# Patient Record
Sex: FEMALE | Race: BLACK OR AFRICAN AMERICAN | NOT HISPANIC OR LATINO | Employment: OTHER | URBAN - METROPOLITAN AREA
[De-identification: names, ages, dates, MRNs, and addresses within clinical notes are randomized per-mention and may not be internally consistent; named-entity substitution may affect disease eponyms.]

---

## 2019-07-17 PROBLEM — I10 HTN (HYPERTENSION): Status: ACTIVE | Noted: 2019-07-17

## 2019-07-17 PROBLEM — J44.9 COPD (CHRONIC OBSTRUCTIVE PULMONARY DISEASE): Status: ACTIVE | Noted: 2019-07-17

## 2019-07-17 PROBLEM — C32.1: Status: ACTIVE | Noted: 2019-07-17

## 2019-07-17 PROBLEM — I25.10 CAD (CORONARY ARTERY DISEASE): Status: ACTIVE | Noted: 2019-07-17

## 2022-05-05 PROBLEM — I65.02 STENOSIS OF LEFT VERTEBRAL ARTERY: Status: ACTIVE | Noted: 2022-05-05

## 2022-05-06 PROBLEM — R79.89 TROPONIN LEVEL ELEVATED: Status: ACTIVE | Noted: 2022-05-06

## 2022-05-06 PROBLEM — I16.9 HYPERTENSIVE CRISIS: Status: RESOLVED | Noted: 2019-07-17 | Resolved: 2022-05-06

## 2022-05-06 PROBLEM — I16.9 HYPERTENSIVE CRISIS: Status: ACTIVE | Noted: 2019-07-17

## 2022-05-06 PROBLEM — I21.4 NSTEMI (NON-ST ELEVATED MYOCARDIAL INFARCTION): Status: ACTIVE | Noted: 2022-05-06

## 2022-05-06 PROBLEM — I16.0 HYPERTENSIVE URGENCY: Status: ACTIVE | Noted: 2022-05-06

## 2022-05-06 PROBLEM — R07.9 CHEST PAIN: Status: ACTIVE | Noted: 2022-05-06

## 2022-05-07 PROBLEM — I20.89 ANGINA AT REST: Status: ACTIVE | Noted: 2022-05-06

## 2022-05-10 PROBLEM — I16.0 HYPERTENSIVE URGENCY: Status: RESOLVED | Noted: 2022-05-06 | Resolved: 2022-05-10

## 2022-05-10 PROBLEM — I20.89 ANGINA AT REST: Status: RESOLVED | Noted: 2022-05-06 | Resolved: 2022-05-10

## 2022-05-10 PROBLEM — R79.89 TROPONIN LEVEL ELEVATED: Status: RESOLVED | Noted: 2022-05-06 | Resolved: 2022-05-10

## 2022-05-10 PROBLEM — I21.4 NSTEMI (NON-ST ELEVATED MYOCARDIAL INFARCTION): Status: RESOLVED | Noted: 2022-05-06 | Resolved: 2022-05-10

## 2022-05-12 ENCOUNTER — PATIENT OUTREACH (OUTPATIENT)
Dept: ADMINISTRATIVE | Facility: CLINIC | Age: 77
End: 2022-05-12

## 2022-05-12 NOTE — PROGRESS NOTES
C3 nurse attempted to contact Nora Rebollar  for a TCC post hospital discharge follow up call. No answer. No voicemail available.The patient does not have a scheduled HOSFU appointment. Message sent to PCP staff for assistance with scheduling visit with patient.       Pt A&O x4. VS stable; afebrile. Still requiring 2L O2 w/ activity. PO oxycodone and scheduled tylenol managing pain. CMS: baseline numbness in BLE's. Dressing: scant amount of dried drainage. Incision iced. Up w/ A1, using gait belt, and walker. Voiding in good amts. Tolerating regular diet.     Reviewed discharge instructions and medications with patient. Questions answered. Patient discharged to TCU via [facility] transport w/ discharge instruction packet and belongings at this time.

## 2022-05-13 NOTE — PROGRESS NOTES
3rd Attempt made to reach patient for TCC call. Left voicemail please call [phone] leave first name, last name, and  for Marya I will return your call.

## 2022-05-25 PROBLEM — I25.118 CORONARY ARTERY DISEASE OF NATIVE ARTERY WITH STABLE ANGINA PECTORIS: Status: ACTIVE | Noted: 2022-05-25

## 2022-05-25 PROBLEM — I10 UNCONTROLLED HYPERTENSION: Status: ACTIVE | Noted: 2022-05-25

## 2022-06-08 PROBLEM — E78.00 HYPERCHOLESTEROLEMIA: Status: ACTIVE | Noted: 2022-06-08

## 2022-06-08 PROBLEM — R00.2 PALPITATIONS: Status: ACTIVE | Noted: 2022-06-08

## 2022-08-09 PROBLEM — M79.89 LEG SWELLING: Status: ACTIVE | Noted: 2022-08-09

## 2022-09-12 PROBLEM — I50.31 ACUTE DIASTOLIC HEART FAILURE: Status: ACTIVE | Noted: 2022-09-12

## 2022-09-12 PROBLEM — J44.1 COPD EXACERBATION: Status: ACTIVE | Noted: 2022-09-12

## 2022-09-12 PROBLEM — E83.42 HYPOMAGNESEMIA: Status: ACTIVE | Noted: 2022-09-12

## 2022-09-12 PROBLEM — E43 SEVERE PROTEIN-ENERGY MALNUTRITION: Status: ACTIVE | Noted: 2022-09-12

## 2022-09-13 PROBLEM — I27.20 PULMONARY HYPERTENSION: Status: ACTIVE | Noted: 2022-09-13

## 2022-09-14 PROBLEM — J44.1 COPD EXACERBATION: Status: RESOLVED | Noted: 2022-09-12 | Resolved: 2022-09-14

## 2022-09-14 PROBLEM — I50.31 ACUTE DIASTOLIC HEART FAILURE: Status: RESOLVED | Noted: 2022-09-12 | Resolved: 2022-09-14

## 2022-09-15 ENCOUNTER — PATIENT OUTREACH (OUTPATIENT)
Dept: ADMINISTRATIVE | Facility: CLINIC | Age: 77
End: 2022-09-15

## 2022-09-28 PROBLEM — N18.31 STAGE 3A CHRONIC KIDNEY DISEASE: Status: ACTIVE | Noted: 2022-09-28

## 2022-09-28 PROBLEM — D72.829 LEUCOCYTOSIS: Status: ACTIVE | Noted: 2022-09-28

## 2022-09-28 PROBLEM — R53.1 GENERALIZED WEAKNESS: Status: ACTIVE | Noted: 2022-09-28

## 2022-09-28 PROBLEM — R74.01 TRANSAMINITIS: Status: ACTIVE | Noted: 2022-09-28

## 2022-09-29 PROBLEM — N17.9 AKI (ACUTE KIDNEY INJURY): Status: ACTIVE | Noted: 2022-09-29

## 2023-01-02 PROBLEM — N17.9 AKI (ACUTE KIDNEY INJURY): Status: RESOLVED | Noted: 2022-09-29 | Resolved: 2023-01-02

## 2023-02-09 PROBLEM — I27.20 PULMONARY HYPERTENSION: Status: RESOLVED | Noted: 2022-09-13 | Resolved: 2023-02-09

## 2023-03-25 PROBLEM — E87.20 LACTIC ACID ACIDOSIS: Status: ACTIVE | Noted: 2023-03-25

## 2023-03-25 PROBLEM — R63.6 SEVERELY UNDERWEIGHT ADULT: Status: ACTIVE | Noted: 2023-03-25

## 2023-03-25 PROBLEM — E86.9 VOLUME DEPLETION: Status: ACTIVE | Noted: 2023-03-25

## 2023-03-26 PROBLEM — R20.9 COLD RIGHT FOOT: Status: ACTIVE | Noted: 2023-03-26

## 2023-03-30 ENCOUNTER — PATIENT OUTREACH (OUTPATIENT)
Dept: ADMINISTRATIVE | Facility: CLINIC | Age: 78
End: 2023-03-30

## 2023-03-30 NOTE — PROGRESS NOTES
C3 nurse attempted to contact patient. The following occurred:   C3 nurse attempted to contact Nora Rebollar  for a TCC post hospital discharge follow up call. The patient is unable to conduct the call @ this time. The patient requested a callback.    The patient does not have a scheduled HOSFU appointment within 5-7 days post hospital discharge date 03/29/2023. Non [facility] PCP

## 2023-03-31 NOTE — PROGRESS NOTES
OTC cough meds    C3 nurse spoke with Nora Rebollar ( son)  for a TCC post hospital discharge follow up call. The patient does not have a scheduled HOSFU appointment with Emery Mendez MD  within 5-7 days post hospital discharge date 03/29/2023. C3 nurse was unable to schedule HOSFU appointment in [facility].    Please contact pcp  and schedule follow up appointment using HOSFU visit type on or before 04/05/2023.

## 2023-04-28 PROBLEM — E83.51 HYPOCALCEMIA: Status: ACTIVE | Noted: 2023-04-28

## 2023-04-28 PROBLEM — E16.2 HYPOGLYCEMIA: Status: ACTIVE | Noted: 2023-04-28

## 2023-04-28 PROBLEM — E87.6 HYPOKALEMIA: Status: ACTIVE | Noted: 2023-04-28

## 2023-04-28 PROBLEM — R62.7 FAILURE TO THRIVE IN ADULT: Status: ACTIVE | Noted: 2023-04-28

## 2023-05-01 PROBLEM — I16.0 HYPERTENSIVE URGENCY: Status: RESOLVED | Noted: 2022-05-06 | Resolved: 2023-05-01

## 2023-05-08 ENCOUNTER — PATIENT OUTREACH (OUTPATIENT)
Dept: ADMINISTRATIVE | Facility: OTHER | Age: 78
End: 2023-05-08

## 2023-05-08 ENCOUNTER — TELEPHONE (OUTPATIENT)
Dept: ADMINISTRATIVE | Facility: CLINIC | Age: 78
End: 2023-05-08

## 2023-05-08 NOTE — PROGRESS NOTES
Caregiver will try to get pt to decrease amount of smoking from 4-5 to 2. Son is requesting home health for pt. States they have not been out since she got out of hospital. Sent in basket message to discharge doctor. We talked about Hospice when son brought it up. I will follow on [place] platform.

## 2023-05-08 NOTE — PROGRESS NOTES
CHW - Initial Contact    This Community Health Worker completed OR updated the Social Determinant of Health questionnaire with caregiver via telephone today.    Pt identified barriers of most importance are: Caregiver will try to get pt to decrease amount of smoking from 4-5 to 2. Son is requesting home health for pt. States they have not been out since she got out of hospital. Sent in basket message to discharge doctor. We talked about Hospice when son brought it up.  Referrals to community agencies completed with patient/caregiver consent outside of [facility] include: no  Referrals were put through [facility] - no  Support and Services: Residential Care  Other information discussed the patient needs / wants help with: SDOH  Follow up required: yes  Follow-up Outreach - Due: 5/14/2023

## 2023-06-08 ENCOUNTER — PATIENT OUTREACH (OUTPATIENT)
Dept: ADMINISTRATIVE | Facility: OTHER | Age: 78
End: 2023-06-08

## 2023-06-08 NOTE — PROGRESS NOTES
CHW - Case Closure    This Community Health Worker spoke to patient via telephone today.   Pt/Caregiver reported: Talked to pt. She states she is at the doctor's office. Stated it was okay to call her son to talk about her medical concerns. He stated she has good an bad days. He states her days are better when she doesn't smoke.  Pt/Caregiver denied any additional needs at this time and agrees with episode closure at this time.  Provided caregiver with Community Health Worker's contact information and encouraged him/her to contact this Community Health Worker if additional needs arise.

## 2023-06-21 PROBLEM — N30.01 ACUTE CYSTITIS WITH HEMATURIA: Status: ACTIVE | Noted: 2023-06-21

## 2023-06-21 PROBLEM — F01.B3 MODERATE VASCULAR DEMENTIA WITH MOOD DISTURBANCE: Status: ACTIVE | Noted: 2023-06-21

## 2023-06-21 PROBLEM — N30.00 ACUTE CYSTITIS WITHOUT HEMATURIA: Status: ACTIVE | Noted: 2023-06-21

## 2023-06-22 PROBLEM — E78.2 MIXED HYPERLIPIDEMIA: Status: ACTIVE | Noted: 2023-02-14

## 2023-06-22 PROBLEM — C32.1 SCC (SQUAMOUS CELL CARCINOMA) OF SUPRAGLOTTIS: Status: ACTIVE | Noted: 2019-03-27

## 2023-06-22 PROBLEM — N17.9 AKI (ACUTE KIDNEY INJURY): Status: RESOLVED | Noted: 2022-09-29 | Resolved: 2023-06-22

## 2023-06-26 ENCOUNTER — PATIENT OUTREACH (OUTPATIENT)
Dept: ADMINISTRATIVE | Facility: CLINIC | Age: 78
End: 2023-06-26

## 2023-06-26 NOTE — PROGRESS NOTES
C3 nurse attempted to contact Nora Smooth for a TCC post hospital discharge follow up call. The patient is unable to confirm name/ & conduct the call @ this time. The patient requested a callback; will have son to assist with call.    The patient does not have a scheduled HOSFU appointment within 5-7 days post hospital discharge date 23. Patient does not have an [facility] PCP.

## 2023-06-27 NOTE — PROGRESS NOTES
C3 nurse attempted to contact Nora Rebollar (SON) for a TCC post hospital discharge follow up call. No answer. The patient does not have a scheduled HOSFU appointment, and the pt does not have an [facility] PCP.